# Patient Record
Sex: FEMALE | Race: WHITE
[De-identification: names, ages, dates, MRNs, and addresses within clinical notes are randomized per-mention and may not be internally consistent; named-entity substitution may affect disease eponyms.]

---

## 2017-11-12 ENCOUNTER — HOSPITAL ENCOUNTER (EMERGENCY)
Dept: HOSPITAL 45 - C.EDB | Age: 47
Discharge: HOME | End: 2017-11-12
Payer: COMMERCIAL

## 2017-11-12 VITALS — DIASTOLIC BLOOD PRESSURE: 72 MMHG | OXYGEN SATURATION: 97 % | HEART RATE: 89 BPM | SYSTOLIC BLOOD PRESSURE: 103 MMHG

## 2017-11-12 VITALS
BODY MASS INDEX: 24.33 KG/M2 | BODY MASS INDEX: 24.33 KG/M2 | WEIGHT: 169.98 LBS | WEIGHT: 169.98 LBS | HEIGHT: 70 IN | HEIGHT: 70 IN

## 2017-11-12 VITALS — TEMPERATURE: 98.42 F
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
## 2017-11-12 DIAGNOSIS — F17.200: ICD-10-CM

## 2017-11-12 DIAGNOSIS — S52.501A: Primary | ICD-10-CM

## 2017-11-12 DIAGNOSIS — Z79.01: ICD-10-CM

## 2017-11-12 DIAGNOSIS — Z91.81: ICD-10-CM
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
## 2017-11-12 DIAGNOSIS — Z86.718: ICD-10-CM

## 2017-11-12 LAB
ALP SERPL-CCNC: 127 U/L (ref 45–117)
ALT SERPL-CCNC: 54 U/L (ref 12–78)
ANION GAP SERPL CALC-SCNC: 7 MMOL/L (ref 3–11)
AST SERPL-CCNC: 33 U/L (ref 15–37)
BASOPHILS # BLD: 0.02 K/UL (ref 0–0.2)
BASOPHILS NFR BLD: 0.2 %
BUN SERPL-MCNC: 19 MG/DL (ref 7–18)
BUN/CREAT SERPL: 31.2 (ref 10–20)
CALCIUM SERPL-MCNC: 9.3 MG/DL (ref 8.5–10.1)
CHLORIDE SERPL-SCNC: 104 MMOL/L (ref 98–107)
CO2 SERPL-SCNC: 29 MMOL/L (ref 21–32)
COMPLETE: YES
CREAT CL PREDICTED SERPL C-G-VRATE: 124.7 ML/MIN
CREAT SERPL-MCNC: 0.59 MG/DL (ref 0.6–1.2)
EOSINOPHIL NFR BLD AUTO: 381 K/UL (ref 130–400)
GLUCOSE SERPL-MCNC: 99 MG/DL (ref 70–99)
HCT VFR BLD CALC: 44.3 % (ref 37–47)
IG%: 0.8 %
IMM GRANULOCYTES NFR BLD AUTO: 22.4 %
INR PPP: 1.9 (ref 0.9–1.1)
LYMPHOCYTES # BLD: 2.05 K/UL (ref 1.2–3.4)
MCH RBC QN AUTO: 26.9 PG (ref 25–34)
MCHC RBC AUTO-ENTMCNC: 32.5 G/DL (ref 32–36)
MCV RBC AUTO: 82.8 FL (ref 80–100)
MONOCYTES NFR BLD: 8.4 %
NEUTROPHILS # BLD AUTO: 4.1 %
NEUTROPHILS NFR BLD AUTO: 64.1 %
PMV BLD AUTO: 9.7 FL (ref 7.4–10.4)
POTASSIUM SERPL-SCNC: 4 MMOL/L (ref 3.5–5.1)
PROTHROMBIN TIME: 21.4 SECONDS (ref 9–12)
RBC # BLD AUTO: 5.35 M/UL (ref 4.2–5.4)
SODIUM SERPL-SCNC: 140 MMOL/L (ref 136–145)
WBC # BLD AUTO: 9.16 K/UL (ref 4.8–10.8)

## 2017-11-12 NOTE — EMERGENCY ROOM VISIT NOTE
History


Report prepared by [place]:  Candy Reece


Under the Supervision of:  Dr. Andrez Oropeza M.D.


First contact with patient:  17:28


Chief Complaint:  WRIST PAIN


Stated Complaint:  FELL,WRIST PAIN AND LOW BACK





History of Present Illness


The patient is a 47 year old female who presents to the Emergency Room with 

complaints of persistent wrist pain that started a couple of hours ago. The 

patient rates her pain a 9/10 in severity. The patient states she was getting 

her kayaks off of the roof of her jeep when she fell on to her right side. She 

notes she tried to catch her fall with her right arm. She is currently feeling 

pain in her right wrist, elbow, and lower back. She states she had a bad fall 

in 2007 that affected her back. When she was getting treated for the injury, 

they found a blood clot so they put her on blood thinners. They were unsure if 

the blood clot was related to the fall. The patient states after she fell today

, she was unable to get up and had to wait a while for her daughter to come 

help her. She denies any leg weakness, HS or LOC.





   Source of History:  patient


   Onset:  a couple of hours ago


   Position:  wrist (right)


   Symptom Intensity:  9/10


   Timing:  other (persistent)


   Associated Symptoms:  + back pain, No weakness


Note:


Additional symptoms: elbow pain.





Review of Systems


See HPI for pertinent positives and negatives.  A total of ten systems were 

reviewed and were otherwise negative.





Past Medical & Surgical


Portal vein blood clot.





Family History





No pertinent family history





Social History


Smoking Status:  Current Some Day Smoker


Alcohol Use:  occasionally


Housing Status:  lives with family


Occupation Status:  employed





Current/Historical Medications


Scheduled


Cholecalciferol (Vitamin D3), 1 DOSE PO DAILY


Cyanocobalamin (Vitamin B-12), 1 DOSE PO DAILY


Warfarin Sod (Coumadin), 5 MG PO WK


Warfarin Sod (Coumadin), 10 MG PO 6XWK





Scheduled PRN


Lidocaine (Lidoderm Patch 5%), 1 PATCH TD DAILY PRN for Pain


Oxycodone Ir (Roxicodone Ir), 1-2 TAB PO Q4H PRN for Pain





Allergies


Coded Allergies:  


     Heparin (Verified  Allergy, Severe, "lethal", 11/12/17)


     Sulfa Antibiotics (Verified  Allergy, Mild, HIVES, RASH, 11/12/17)


     Sulfamethoxazole (Unverified  Allergy, Mild, HIVES, RASH, 11/12/17)


     Trimethoprim (Unverified  Allergy, Mild, HIVES, RASH, 11/12/17)





Physical Exam


Vital Signs











  Date Time  Temp Pulse Resp B/P (MAP) Pulse Ox O2 Delivery O2 Flow Rate FiO2


 


11/12/17 21:02  89 18 103/72 97 Room Air  


 


11/12/17 20:02  98 20 118/72 100 Room Air  


 


11/12/17 17:23 36.9 99 18 164/119 98 Room Air  











Physical Exam


GENERAL: Awake, alert, uncomfortable appearing, in no distress


HENT: Normocephalic, atraumatic. Oropharynx unremarkable.


EYES: Normal conjunctiva. Sclera non-icteric.


NECK: Supple. No nuchal rigidity. FROM. No JVD.


RESPIRATORY: Clear to auscultation.


CARDIAC: Regular rate, normal rhythm. Extremities warm and well perfused. 

Pulses equal.


ABDOMEN: Soft, non-distended. No tenderness to palpation. No rebound or 

guarding. No masses.


BACK: Lower T-spine and upper L-spine contusion with mild tenderness. Right 

superior gluteal area of fluctuance with mild ttp c/w hematoma. 


RECTAL: Deferred.


MUSCULOSKELETAL: Chest examination reveals no tenderness. There is no CVA 

tenderness to palpation.


EXTREMITIES: Swelling to dorsal/radial aspect of right wrist/hand with small 

hematoma. No snuff box ttp. Mild ttp to radial aspect of proximal forearm. 


NEURO: Normal sensorium. No sensory or motor deficits noted. Strength 5/5 and 

SILT x 4 ext. 


SKIN: No rash or jaundice noted.





Medical Decision & Procedures


ER Provider


Diagnostic Interpretation:


Radiology results as stated below per my review and radiologist interpretation: 





R FOREARM 2 VIEWS ROUTINE, R ELBOW MIN 3 VIEWS ROUTINE, R WRIST MIN 3 VIEWS


ROUTINE, R HAND MIN 3 VIEWS ROUTINE





HISTORY:  47 years-old Female pain, fall acute trauma of the right upper


extremity status post fall





COMPARISON: None available





TECHNIQUE: 3 views of the right elbow, 2 views of the right forearm, 4 views the


right wrist and 3 views of the right hand





FINDINGS: 





ELBOW:


Mild marginal spurring of the lateral epicondyle. No acute fracture or


dislocation. No large joint effusion or opaque foreign body. Radial head appears


intact.





FOREARM:


Bones are mildly demineralized. Acute fracture of the distal radius. Proximal


radius and ulna are intact. Mild soft tissue swelling about the distal radius.





WRIST:


There is an acute nondisplaced transversely oriented fracture of the distal


radial metaphysis with possible extension into the distal radioulnar joint as


seen on the PA view. No angulation. Distal ulna appears intact. Mild first


carpometacarpal and triscaphe osteoarthritis. Bones are mildly demineralized.


Mild soft tissue swelling about the distal forearm and wrist.





HAND:


Bones are mildly demineralized. No acute fracture or dislocation. Mild


degenerative changes with interphalangeal joint space narrowing and subchondral


sclerosis.





IMPRESSION: 


1. Acute nondisplaced transversely oriented fracture of the distal radial


metaphysis with possible extension into the distal radioulnar joint. Mild


associated soft tissue swelling.


2. The degree of bone demineralization is greater than expected for patient's


stated age.


3. Mild degenerative changes about the hand and wrist without additional acute


fracture or dislocation identified within the right upper extremity.





The above report was generated using voice recognition software. It may contain


grammatical, syntax or spelling errors.





Electronically signed by:  Edwar Paulino M.D.


11/12/2017 7:02 PM





Dictated Date/Time:  11/12/2017 6:57 PM











R FOREARM 2 VIEWS ROUTINE, R ELBOW MIN 3 VIEWS ROUTINE, R WRIST MIN 3 VIEWS


ROUTINE, R HAND MIN 3 VIEWS ROUTINE





HISTORY:  47 years-old Female pain, fall acute trauma of the right upper


extremity status post fall





COMPARISON: None available





TECHNIQUE: 3 views of the right elbow, 2 views of the right forearm, 4 views the


right wrist and 3 views of the right hand





FINDINGS: 





ELBOW:


Mild marginal spurring of the lateral epicondyle. No acute fracture or


dislocation. No large joint effusion or opaque foreign body. Radial head appears


intact.





FOREARM:


Bones are mildly demineralized. Acute fracture of the distal radius. Proximal


radius and ulna are intact. Mild soft tissue swelling about the distal radius.





WRIST:


There is an acute nondisplaced transversely oriented fracture of the distal


radial metaphysis with possible extension into the distal radioulnar joint as


seen on the PA view. No angulation. Distal ulna appears intact. Mild first


carpometacarpal and triscaphe osteoarthritis. Bones are mildly demineralized.


Mild soft tissue swelling about the distal forearm and wrist.





HAND:


Bones are mildly demineralized. No acute fracture or dislocation. Mild


degenerative changes with interphalangeal joint space narrowing and subchondral


sclerosis.





IMPRESSION: 


1. Acute nondisplaced transversely oriented fracture of the distal radial


metaphysis with possible extension into the distal radioulnar joint. Mild


associated soft tissue swelling.


2. The degree of bone demineralization is greater than expected for patient's


stated age.


3. Mild degenerative changes about the hand and wrist without additional acute


fracture or dislocation identified within the right upper extremity.





The above report was generated using voice recognition software. It may contain


grammatical, syntax or spelling errors.





Electronically signed by:  Edwar Paulino M.D.


11/12/2017 7:02 PM





Dictated Date/Time:  11/12/2017 6:57 PM











R FOREARM 2 VIEWS ROUTINE, R ELBOW MIN 3 VIEWS ROUTINE, R WRIST MIN 3 VIEWS


ROUTINE, R HAND MIN 3 VIEWS ROUTINE





HISTORY:  47 years-old Female pain, fall acute trauma of the right upper


extremity status post fall





COMPARISON: None available





TECHNIQUE: 3 views of the right elbow, 2 views of the right forearm, 4 views the


right wrist and 3 views of the right hand





FINDINGS: 





ELBOW:


Mild marginal spurring of the lateral epicondyle. No acute fracture or


dislocation. No large joint effusion or opaque foreign body. Radial head appears


intact.





FOREARM:


Bones are mildly demineralized. Acute fracture of the distal radius. Proximal


radius and ulna are intact. Mild soft tissue swelling about the distal radius.





WRIST:


There is an acute nondisplaced transversely oriented fracture of the distal


radial metaphysis with possible extension into the distal radioulnar joint as


seen on the PA view. No angulation. Distal ulna appears intact. Mild first


carpometacarpal and triscaphe osteoarthritis. Bones are mildly demineralized.


Mild soft tissue swelling about the distal forearm and wrist.





HAND:


Bones are mildly demineralized. No acute fracture or dislocation. Mild


degenerative changes with interphalangeal joint space narrowing and subchondral


sclerosis.





IMPRESSION: 


1. Acute nondisplaced transversely oriented fracture of the distal radial


metaphysis with possible extension into the distal radioulnar joint. Mild


associated soft tissue swelling.


2. The degree of bone demineralization is greater than expected for patient's


stated age.


3. Mild degenerative changes about the hand and wrist without additional acute


fracture or dislocation identified within the right upper extremity.





The above report was generated using voice recognition software. It may contain


grammatical, syntax or spelling errors.





Electronically signed by:  Edwar Paulino M.D.


11/12/2017 7:02 PM





Dictated Date/Time:  11/12/2017 6:57 PM











R FOREARM 2 VIEWS ROUTINE, R ELBOW MIN 3 VIEWS ROUTINE, R WRIST MIN 3 VIEWS


ROUTINE, R HAND MIN 3 VIEWS ROUTINE





HISTORY:  47 years-old Female pain, fall acute trauma of the right upper


extremity status post fall





COMPARISON: None available





TECHNIQUE: 3 views of the right elbow, 2 views of the right forearm, 4 views the


right wrist and 3 views of the right hand





FINDINGS: 





ELBOW:


Mild marginal spurring of the lateral epicondyle. No acute fracture or


dislocation. No large joint effusion or opaque foreign body. Radial head appears


intact.





FOREARM:


Bones are mildly demineralized. Acute fracture of the distal radius. Proximal


radius and ulna are intact. Mild soft tissue swelling about the distal radius.





WRIST:


There is an acute nondisplaced transversely oriented fracture of the distal


radial metaphysis with possible extension into the distal radioulnar joint as


seen on the PA view. No angulation. Distal ulna appears intact. Mild first


carpometacarpal and triscaphe osteoarthritis. Bones are mildly demineralized.


Mild soft tissue swelling about the distal forearm and wrist.





HAND:


Bones are mildly demineralized. No acute fracture or dislocation. Mild


degenerative changes with interphalangeal joint space narrowing and subchondral


sclerosis.





IMPRESSION: 


1. Acute nondisplaced transversely oriented fracture of the distal radial


metaphysis with possible extension into the distal radioulnar joint. Mild


associated soft tissue swelling.


2. The degree of bone demineralization is greater than expected for patient's


stated age.


3. Mild degenerative changes about the hand and wrist without additional acute


fracture or dislocation identified within the right upper extremity.





The above report was generated using voice recognition software. It may contain


grammatical, syntax or spelling errors.





Electronically signed by:  Edwar Paulino M.D.


11/12/2017 7:02 PM





Dictated Date/Time:  11/12/2017 6:57 PM











CHEST CT WITH CONTRAST





HISTORY: Acute chest trauma status post fall  pain, fall on coumadin





TECHNIQUE: Multiaxial CT images of the chest were performed following the


intravenous administration of contrast.  115 mL Optiray 320 IV contrast


administered. A dose lowering technique was utilized adhering to the principles


of ALARA.





COMPARISON:  CT abdomen and pelvis of same day, chest CT 9/11/2011.


 


FINDINGS: 


 No dominant thyroid nodule identified. Ill-defined soft tissue density of the


anterior mediastinum may reflect residual thymic tissue, unchanged. No


pathologic adenopathy about the chest. Heart is normal in size without


pericardial effusion. The thoracic aorta is normal in course and caliber without


dissection or aneurysm. The opacified pulmonary arterial tree is unremarkable.





There is no pneumothorax or pleural effusion. Mild dependent bibasilar


atelectasis. Central airways are patent. Lung fields are otherwise clear.





No acute abnormality of the imaged upper abdomen. Splenomegaly redemonstrated.


1.9 cm low attenuating lesion of the subserosal right hepatic lobe appears


unchanged suggesting benign etiology. Soft tissues are unremarkable. The bones


of the chest appear intact.





IMPRESSION: 


1. No acute intrathoracic abnormality identified. No pneumothorax.


2. No acute thoracic fracture identified. 


3. Splenomegaly.





Electronically signed by:  Edwar Paulino M.D.


11/12/2017 7:51 PM





Dictated Date/Time:  11/12/2017 7:46 PM











ABDOMEN AND PELVIS CT WITH IV CONTRAST





CT DOSE: 809.01 mGy.cm





HISTORY: Acute low back pain and abdominal trauma status post fall  pain, lower


back hematoma





TECHNIQUE: Multiaxial CT images of the abdomen and pelvis were performed


following the use of intravenous contrast. 116 mL Optiray 320 IV contrast was


administered A dose lowering technique was utilized adhering to the principles


of ALARA.





COMPARISON STUDY: CT chest of same day, CT abdomen and pelvis 9/11/2011.





FINDINGS: 


Lung bases are generally clear. No pneumoperitoneum. Study is limited secondary


to patient positioning of right upper extremity. Imaged inferior cardiac


chambers are unremarkable.





Spleen is moderately enlarged, 15 cm in length, unchanged from prior. Collateral


vessels are again seen adjacent to the splenic hilum and within the upper


abdomen. Circumscribed 1.9 cm low attenuating lesion of the subserosal right


hepatic lobe is unchanged from comparison study 9/11/2011 suggesting benign


etiology such as a hemangioma. The previously noted additional low attenuating


lesions of the inferior right hepatic lobe are not as well seen on today's


study. There is mild intrahepatic biliary ductal dilation which appears


unchanged. Gallbladder, pancreas and adrenal glands are unremarkable.





Kidneys, ureters and urinary bladder are unremarkable. Nonspecific 4 mm low


attenuating lesion of the interpolar right kidney suggests cyst. Prior


hysterectomy. The dominant aorta is normal in both course and caliber. There is


no bulky adenopathy identified.





There is no bowel obstruction or focal bowel wall thickening. Nondilated


fecalized loops of small bowel are seen within the lower abdomen and pelvis. The


appendix appears normal. No retroperitoneal hematoma.





Soft tissues are unremarkable. The bones appear intact. No acute fracture


identified.





IMPRESSION: 





1. No acute abnormality identified involving the abdomen or pelvis. No evidence


of acute solid organ injury.


2. Several nondilated fecalized loops of small bowel are seen within the lower


abdomen and pelvis, suggesting decreased small bowel transit without evidence of


bowel obstruction.


3. Unchanged splenomegaly.


4. Stable appearance of the liver as above.


5. Prior hysterectomy.





Electronically signed by:  Edwar Paulino M.D.


11/12/2017 8:00 PM





Dictated Date/Time:  11/12/2017 7:52 PM





Laboratory Results


11/12/17 18:01








Red Blood Count 5.35, Mean Corpuscular Volume 82.8, Mean Corpuscular Hemoglobin 

26.9, Mean Corpuscular Hemoglobin Concent 32.5, Mean Platelet Volume 9.7, 

Neutrophils (%) (Auto) 64.1, Lymphocytes (%) (Auto) 22.4, Monocytes (%) (Auto) 

8.4, Eosinophils (%) (Auto) 4.1, Basophils (%) (Auto) 0.2, Neutrophils # (Auto) 

5.87, Lymphocytes # (Auto) 2.05, Monocytes # (Auto) 0.77, Eosinophils # (Auto) 

0.38, Basophils # (Auto) 0.02





11/12/17 18:01

















Test


  11/12/17


18:01


 


White Blood Count


  9.16 K/uL


(4.8-10.8)


 


Red Blood Count


  5.35 M/uL


(4.2-5.4)


 


Hemoglobin


  14.4 g/dL


(12.0-16.0)


 


Hematocrit 44.3 % (37-47) 


 


Mean Corpuscular Volume


  82.8 fL


()


 


Mean Corpuscular Hemoglobin


  26.9 pg


(25-34)


 


Mean Corpuscular Hemoglobin


Concent 32.5 g/dl


(32-36)


 


Platelet Count


  381 K/uL


(130-400)


 


Mean Platelet Volume


  9.7 fL


(7.4-10.4)


 


Neutrophils (%) (Auto) 64.1 % 


 


Lymphocytes (%) (Auto) 22.4 % 


 


Monocytes (%) (Auto) 8.4 % 


 


Eosinophils (%) (Auto) 4.1 % 


 


Basophils (%) (Auto) 0.2 % 


 


Neutrophils # (Auto)


  5.87 K/uL


(1.4-6.5)


 


Lymphocytes # (Auto)


  2.05 K/uL


(1.2-3.4)


 


Monocytes # (Auto)


  0.77 K/uL


(0.11-0.59)


 


Eosinophils # (Auto)


  0.38 K/uL


(0-0.5)


 


Basophils # (Auto)


  0.02 K/uL


(0-0.2)


 


RDW Standard Deviation


  43.0 fL


(36.4-46.3)


 


RDW Coefficient of Variation


  14.2 %


(11.5-14.5)


 


Immature Granulocyte % (Auto) 0.8 % 


 


Immature Granulocyte # (Auto)


  0.07 K/uL


(0.00-0.02)


 


Prothrombin Time


  21.4 SECONDS


(9.0-12.0)


 


Prothromb Time International


Ratio 1.9 (0.9-1.1) 


 


 


Anion Gap


  7.0 mmol/L


(3-11)


 


Est Creatinine Clear Calc


Drug Dose 124.7 ml/min 


 


 


Estimated GFR (


American) 124.7 


 


 


Estimated GFR (Non-


American 107.6 


 


 


BUN/Creatinine Ratio 31.2 (10-20) 


 


Calcium Level


  9.3 mg/dl


(8.5-10.1)


 


Total Bilirubin


  0.4 mg/dl


(0.2-1)


 


Direct Bilirubin


  < 0.1 mg/dl


(0-0.2)


 


Aspartate Amino Transf


(AST/SGOT) 33 U/L (15-37) 


 


 


Alanine Aminotransferase


(ALT/SGPT) 54 U/L (12-78) 


 


 


Alkaline Phosphatase


  127 U/L


()


 


Total Protein


  8.0 gm/dl


(6.4-8.2)


 


Albumin


  4.2 gm/dl


(3.4-5.0)


 


Lipase


  192 U/L


()





Laboratory results reviewed by me





Medications Administered











 Medications


  (Trade)  Dose


 Ordered  Sig/Benedicto


 Route  Start Time


 Stop Time Status Last Admin


Dose Admin


 


 Morphine Sulfate


  (MoRPHine


 SULFATE INJ)  4 mg  NOW  STAT


 IV  11/12/17 17:41


 11/12/17 17:52 DC 11/12/17 18:01


4 MG


 


 Lidocaine


  (Lidoderm Patch


 5%)  1 patch  NOW  STAT


 TD  11/12/17 20:38


 11/12/17 20:39 DC 11/12/17 20:56


1 PATCH


 


 Oxycodone HCl


  (Roxicodone


 Immediate Rel 5MG


 Home Pack)  1 homepack  UD  ONCE


 PO  11/12/17 21:00


 11/12/17 21:01 DC 11/12/17 21:00


1 HOMEPACK











ED Course


1728: The patient was evaluated in room A2. A complete history and physical 

exam was performed. 





1741: Morphine Sulfate 4 mg IV, Sodium Chloride 1000 ml @ 125 mls/hr IV.





Medical Decision


I reviewed the patient's past medical history, medications, and the nursing 

notes as described above.





The patient's presentation and history were concerning for Soft tissue injury, 

fracture, hematoma.  





The patient is a 47-year-old woman with a past medical history of a portal vein 

thrombosis on lifelong Coumadin who presents to emergency department with right 

hand and wrist pain as well as low back pain after falling when taking her 

kayak off of her car per history of present illness.  Arrival the patient is 

uncomfortable but in no acute distress she is afebrile with stable vital signs.

  Moderate tenderness to the radial aspect of the dorsum of the right hand and 

wrist with a small overlying hematoma.  She has a small contusion to the lower 

T and upper L-spine with mild tenderness to palpation as well as a 5 cm right 

superior gluteal area of fluctuance c/w hematoma with mild tenderness to 

palpation.  Otherwise distal perfusion, motor, sensory intact in all 4 

extremities.  Films of the right upper extremity demonstrated a nondisplaced 

radial fracture with question of extension into the joint.  CT chest/abdomen 

and pelvis unremarkable. TL spine CT with chronic findings and no acute 

abnormalities. INR 1.9 and labs otherwise unremarkable. Patient follows with 

[facility] for her chronic back pain and so will have her follow up there 

for her distal radius fracture. Findings and plan for follow-up reviewed with 

patient. Patient agreeable and d/c'd per discharge instructions.





Medication Reconcilliation


Current Medication List:  was personally reviewed by me





Blood Pressure Screening


Patient's blood pressure:  Elevated blood pressure


Blood pressure disposition:  Elevated BP felt to be situational





Impression





 Primary Impression:  


 Distal radius fracture, right


 Additional Impression:  


 Hematoma





Scribe Attestation


The scribe's documentation has been prepared under my direction and personally 

reviewed by me in its entirety. I confirm that the note above accurately 

reflects all work, treatment, procedures, and medical decision making performed 

by me.





Departure Information


Dispostion


Home / Self-Care





Prescriptions





Lidocaine (Lidoderm Patch 5%) 1 Ea Tdsy


1 PATCH TD DAILY Y for Pain for 7 Days, #7 PATCH


   Apply painful area for 12 hours, then remove for 12 hours.


   Prov: Andrez Oropeza M.D.         11/12/17 


Oxycodone Ir (Roxicodone Ir) 5 Mg Tab


1-2 TAB PO Q4H Y for Pain, #10 TAB


   Prov: Andrez Oropeza M.D.         11/12/17





Patient Instructions


Distal Radius Fx, ED Hematoma, ED RICE, My [facility]





Additional Instructions





Please follow up with your orthopedics next week for re-evaluation.





You were found to have a wrist fracture (distal radius fracture).


Otherwise, your exam, lab results, CT scans of your chest, abdomen/pelvis, 

thoracic and lumbar spine did not show signs of an emergent condition at this 

time.





Acetaminophen and Lidoderm patch for pain as needed.


Oxycodone for breakthrough pain as needed.


Ace bandage for compression of lower back bruising.


RICE therapy.





Return to the emergency department for worsening symptoms as described in the 

accompanying instructions.





Problem Qualifiers

## 2017-11-12 NOTE — DIAGNOSTIC IMAGING REPORT
THORACIC SPINE WITHOUT



HISTORY:  47 years-old Female pain, fall acute back pain that is post fall



COMPARISON: CT lumbar spine of same day



TECHNIQUE: Multiple axial CT images of the thoracic spine were obtained without

contrast. A dose lowering technique was used consistent with the principals of

ALARA.



FINDINGS: 

The bones are mildly demineralized. Advanced for patient's stated age. Mild

multilevel endplate spurring and facet arthropathy. Broad-based posterior disc

osteophyte complex formation seen at T12-L1. No high-grade central canal or

foraminal narrowing identified. No acute rib fracture identified.



No pneumothorax. No acute intra-abdominal or paraspinal abnormality identified.



IMPRESSION: 

1. No acute fracture or subluxation.

2. Broad-based posterior disc osteophyte complex at T12-L1 without high-grade

central canal or foraminal narrowing.

3. Mild bone demineralization appears advanced for patient's stated age. This

could be correlated with a follow-up outpatient DEXA scan.







The above report was generated using voice recognition software. It may contain

grammatical, syntax or spelling errors.







Electronically signed by:  Edwar Paulino M.D.

11/12/2017 8:13 PM



Dictated Date/Time:  11/12/2017 8:07 PM

## 2017-11-12 NOTE — DIAGNOSTIC IMAGING REPORT
LUMBAR SPINE WITHOUT



HISTORY:  47 years-old Female pain, fall acute back pain status post fall



COMPARISON: CT abdomen and pelvis 9/11/2011



TECHNIQUE: Multiple axial CT images of the lumbar spine were obtained without

contrast. A dose lowering technique was used consistent with the principals of

ALARA.



FINDINGS: 

There are 5 nonrib-bearing lumbar type vertebral segments. The bones are mildly

demineralized, advanced for patient's stated age. 3 mm retrolisthesis L2 on L3

and 3 mm retrolisthesis L3 on L4 noted, likely degenerative in nature. Mild

multilevel facet arthropathy and endplate spurring is noted. Broad-based

posterior disc osteophyte complex formation noted at T12-L1 which flattens the

ventral thecal sac. No high-grade central canal narrowing. Posterior elements

also appear intact without acute fracture or subluxation. No high-grade

foraminal narrowing identified. Sacrum and imaged iliac bones appear intact.



No acute intra-abdominal, intrapelvic or paraspinal abnormality identified.



IMPRESSION: 

1. No acute fracture or subluxation of the lumbar spine.

2. 3 mm retrolisthesis L2 on L3 and L3 on L4, likely degenerative in nature.

Mild multilevel facet arthropathy.

3. Broad-based posterior disc osteophyte complex formation at T12-L1 flattens

the ventral thecal sac without high-grade central canal stenosis.







The above report was generated using voice recognition software. It may contain

grammatical, syntax or spelling errors.







Electronically signed by:  Edwar Paulino M.D.

11/12/2017 8:07 PM



Dictated Date/Time:  11/12/2017 8:00 PM

## 2017-11-12 NOTE — DIAGNOSTIC IMAGING REPORT
CHEST CT WITH CONTRAST



HISTORY: Acute chest trauma status post fall  pain, fall on coumadin



TECHNIQUE: Multiaxial CT images of the chest were performed following the

intravenous administration of contrast.  115 mL Optiray 320 IV contrast

administered. A dose lowering technique was utilized adhering to the principles

of ALARA.



COMPARISON:  CT abdomen and pelvis of same day, chest CT 9/11/2011.

 

FINDINGS: 

 No dominant thyroid nodule identified. Ill-defined soft tissue density of the

anterior mediastinum may reflect residual thymic tissue, unchanged. No

pathologic adenopathy about the chest. Heart is normal in size without

pericardial effusion. The thoracic aorta is normal in course and caliber without

dissection or aneurysm. The opacified pulmonary arterial tree is unremarkable.



There is no pneumothorax or pleural effusion. Mild dependent bibasilar

atelectasis. Central airways are patent. Lung fields are otherwise clear.



No acute abnormality of the imaged upper abdomen. Splenomegaly redemonstrated.

1.9 cm low attenuating lesion of the subserosal right hepatic lobe appears

unchanged suggesting benign etiology. Soft tissues are unremarkable. The bones

of the chest appear intact.



IMPRESSION: 

1. No acute intrathoracic abnormality identified. No pneumothorax.

2. No acute thoracic fracture identified. 

3. Splenomegaly.







Electronically signed by:  Edwar Paulino M.D.

11/12/2017 7:51 PM



Dictated Date/Time:  11/12/2017 7:46 PM

## 2017-11-12 NOTE — DIAGNOSTIC IMAGING REPORT
R FOREARM 2 VIEWS ROUTINE, R ELBOW MIN 3 VIEWS ROUTINE, R WRIST MIN 3 VIEWS

ROUTINE, R HAND MIN 3 VIEWS ROUTINE



HISTORY:  47 years-old Female pain, fall acute trauma of the right upper

extremity status post fall



COMPARISON: None available



TECHNIQUE: 3 views of the right elbow, 2 views of the right forearm, 4 views the

right wrist and 3 views of the right hand



FINDINGS: 



ELBOW:

Mild marginal spurring of the lateral epicondyle. No acute fracture or

dislocation. No large joint effusion or opaque foreign body. Radial head appears

intact.



FOREARM:

Bones are mildly demineralized. Acute fracture of the distal radius. Proximal

radius and ulna are intact. Mild soft tissue swelling about the distal radius.



WRIST:

There is an acute nondisplaced transversely oriented fracture of the distal

radial metaphysis with possible extension into the distal radioulnar joint as

seen on the PA view. No angulation. Distal ulna appears intact. Mild first

carpometacarpal and triscaphe osteoarthritis. Bones are mildly demineralized.

Mild soft tissue swelling about the distal forearm and wrist.



HAND:

Bones are mildly demineralized. No acute fracture or dislocation. Mild

degenerative changes with interphalangeal joint space narrowing and subchondral

sclerosis.



IMPRESSION: 

1. Acute nondisplaced transversely oriented fracture of the distal radial

metaphysis with possible extension into the distal radioulnar joint. Mild

associated soft tissue swelling.

2. The degree of bone demineralization is greater than expected for patient's

stated age.

3. Mild degenerative changes about the hand and wrist without additional acute

fracture or dislocation identified within the right upper extremity.







The above report was generated using voice recognition software. It may contain

grammatical, syntax or spelling errors.







Electronically signed by:  Edwar Paulino M.D.

11/12/2017 7:02 PM



Dictated Date/Time:  11/12/2017 6:57 PM

## 2018-03-02 ENCOUNTER — HOSPITAL ENCOUNTER (OUTPATIENT)
Dept: HOSPITAL 45 - C.LAB1850 | Age: 48
Discharge: HOME | End: 2018-03-02
Attending: PHYSICIAN ASSISTANT
Payer: COMMERCIAL

## 2018-03-02 DIAGNOSIS — E05.90: Primary | ICD-10-CM

## 2018-05-11 ENCOUNTER — HOSPITAL ENCOUNTER (OUTPATIENT)
Dept: HOSPITAL 45 - C.LAB | Age: 48
Discharge: HOME | End: 2018-05-11
Attending: INTERNAL MEDICINE
Payer: COMMERCIAL

## 2018-05-11 DIAGNOSIS — E05.90: Primary | ICD-10-CM

## 2018-05-11 DIAGNOSIS — M81.0: ICD-10-CM

## 2024-01-03 NOTE — DIAGNOSTIC IMAGING REPORT
ABDOMEN AND PELVIS CT WITH IV CONTRAST



CT DOSE: 809.01 mGy.cm



HISTORY: Acute low back pain and abdominal trauma status post fall  pain, lower

back hematoma



TECHNIQUE: Multiaxial CT images of the abdomen and pelvis were performed

following the use of intravenous contrast. 116 mL Optiray 320 IV contrast was

administered A dose lowering technique was utilized adhering to the principles

of ALARA.



COMPARISON STUDY: CT chest of same day, CT abdomen and pelvis 9/11/2011.



FINDINGS: 

Lung bases are generally clear. No pneumoperitoneum. Study is limited secondary

to patient positioning of right upper extremity. Imaged inferior cardiac

chambers are unremarkable.



Spleen is moderately enlarged, 15 cm in length, unchanged from prior. Collateral

vessels are again seen adjacent to the splenic hilum and within the upper

abdomen. Circumscribed 1.9 cm low attenuating lesion of the subserosal right

hepatic lobe is unchanged from comparison study 9/11/2011 suggesting benign

etiology such as a hemangioma. The previously noted additional low attenuating

lesions of the inferior right hepatic lobe are not as well seen on today's

study. There is mild intrahepatic biliary ductal dilation which appears

unchanged. Gallbladder, pancreas and adrenal glands are unremarkable.



Kidneys, ureters and urinary bladder are unremarkable. Nonspecific 4 mm low

attenuating lesion of the interpolar right kidney suggests cyst. Prior

hysterectomy. The dominant aorta is normal in both course and caliber. There is

no bulky adenopathy identified.



There is no bowel obstruction or focal bowel wall thickening. Nondilated

fecalized loops of small bowel are seen within the lower abdomen and pelvis. The

appendix appears normal. No retroperitoneal hematoma.



Soft tissues are unremarkable. The bones appear intact. No acute fracture

identified.



IMPRESSION: 



1. No acute abnormality identified involving the abdomen or pelvis. No evidence

of acute solid organ injury.

2. Several nondilated fecalized loops of small bowel are seen within the lower

abdomen and pelvis, suggesting decreased small bowel transit without evidence of

bowel obstruction.

3. Unchanged splenomegaly.

4. Stable appearance of the liver as above.

5. Prior hysterectomy.







Electronically signed by:  Edwar Paulino M.D.

11/12/2017 8:00 PM



Dictated Date/Time:  11/12/2017 7:52 PM Department of Neurosurgery  Progress Note    CHIEF COMPLAINT: T12 burst fracture, s/p fusion    SUBJECTIVE:  c/o op site pain and leg pain    REVIEW OF SYSTEMS :  Constitutional: Negative for chills and fever.    Neurological: Negative for dizziness, tremors and speech change.     OBJECTIVE:   VITALS:  /63   Pulse (!) 110   Temp 97.8 °F (36.6 °C) (Temporal)   Resp 16   SpO2 93%     PHYSICAL:  Neurologic: Alert and oriented x3; PERRL  Motor Exam:  Motor exam is symmetrical 5 out of 5 all extremities bilaterally  Sensory:  Sensory intact    DATA:  CBC:   Lab Results   Component Value Date/Time    WBC 13.4 01/02/2024 07:06 AM    RBC 4.32 01/02/2024 07:06 AM    HGB 12.2 01/02/2024 07:06 AM    HCT 37.4 01/02/2024 07:06 AM    MCV 86.6 01/02/2024 07:06 AM    MCH 28.2 01/02/2024 07:06 AM    MCHC 32.6 01/02/2024 07:06 AM    RDW 12.6 01/02/2024 07:06 AM     01/02/2024 07:06 AM    MPV 9.5 01/02/2024 07:06 AM     BMP:    Lab Results   Component Value Date/Time     01/02/2024 07:06 AM    K 3.8 01/02/2024 07:06 AM     01/02/2024 07:06 AM    CO2 26 01/02/2024 07:06 AM    BUN 11 01/02/2024 07:06 AM    LABALBU 4.4 12/09/2023 11:30 AM    LABALBU 4.6 07/31/2011 08:25 AM    CREATININE 0.5 01/02/2024 07:06 AM    CALCIUM 9.0 01/02/2024 07:06 AM    LABGLOM >60 01/02/2024 07:06 AM    GLUCOSE 108 01/02/2024 07:06 AM    GLUCOSE 88 07/31/2011 08:25 AM     PT/INR:    Lab Results   Component Value Date/Time    PROTIME 12.1 01/01/2024 06:50 AM    INR 1.1 01/01/2024 06:50 AM     PTT:    Lab Results   Component Value Date/Time    APTT 30.4 01/01/2024 06:50 AM   [APTT}    Current Inpatient Medications  Current Facility-Administered Medications: HYDROcodone-acetaminophen (NORCO)  MG per tablet 1 tablet, 1 tablet, Oral, Q4H PRN  0.9 % sodium chloride infusion, , IntraVENous, Continuous  HYDROmorphone HCl PF (DILAUDID) injection 1 mg, 1 mg, IntraVENous, Q2H PRN  sodium chloride flush 0.9 % injection 5-40 mL, 5-40 mL,